# Patient Record
Sex: MALE | Race: WHITE | NOT HISPANIC OR LATINO | Employment: OTHER | URBAN - METROPOLITAN AREA
[De-identification: names, ages, dates, MRNs, and addresses within clinical notes are randomized per-mention and may not be internally consistent; named-entity substitution may affect disease eponyms.]

---

## 2024-02-21 LAB
NON-UH HIE AGAP: 9 MEQ/L (ref 6–16)
NON-UH HIE ALT: 21 INT._UNIT/L (ref 6–46)
NON-UH HIE AST: 19 INT._UNIT/L (ref 5–43)
NON-UH HIE BUN/CREAT RATIO: 18 NO UNITS (ref 10–20)
NON-UH HIE BUN: 22 MG/DL (ref 5–21)
NON-UH HIE CALCIUM LVL: 9.2 MG/DL (ref 8.9–11.1)
NON-UH HIE CHLORIDE: 108 MMOL/L (ref 101–111)
NON-UH HIE CHOL: 154 MG/DL (ref 120–200)
NON-UH HIE CO2: 29 MMOL/L (ref 21–31)
NON-UH HIE CREATININE: 1.2 MG/DL (ref 0.5–1.3)
NON-UH HIE EGFR: 64 ML/MIN/1.73 M2
NON-UH HIE GLUCOSE LVL: 107 MG/DL (ref 55–199)
NON-UH HIE HDL: 71 MG/DL
NON-UH HIE LDL DIRECT: 65 MG/DL
NON-UH HIE POTASSIUM LVL: 4.6 MMOL/L (ref 3.5–5.3)
NON-UH HIE SODIUM LVL: 141 MMOL/L (ref 135–145)
NON-UH HIE TRIG: 60 MG/DL
NON-UH HIE VLDL: 12 MG/DL (ref 7–40)

## 2024-03-25 PROBLEM — R00.1 SINUS BRADYCARDIA: Status: ACTIVE | Noted: 2024-03-25

## 2024-03-25 PROBLEM — E78.5 HYPERLIPIDEMIA: Status: ACTIVE | Noted: 2024-03-25

## 2024-03-25 PROBLEM — R94.31 ABNORMAL EKG: Status: ACTIVE | Noted: 2024-03-25

## 2024-03-25 PROBLEM — I10 ESSENTIAL HYPERTENSION: Status: ACTIVE | Noted: 2024-03-25

## 2024-03-25 PROBLEM — I49.3 PREMATURE VENTRICULAR CONTRACTIONS: Status: ACTIVE | Noted: 2024-03-25

## 2024-03-25 PROBLEM — R55 SYNCOPE: Status: ACTIVE | Noted: 2024-03-25

## 2024-03-25 RX ORDER — TAMSULOSIN HYDROCHLORIDE 0.4 MG/1
0.4 CAPSULE ORAL DAILY
COMMUNITY

## 2024-03-25 RX ORDER — CALCIUM CARBONATE 300MG(750)
1 TABLET,CHEWABLE ORAL DAILY
COMMUNITY

## 2024-03-25 RX ORDER — MULTIVITAMIN/IRON/FOLIC ACID 18MG-0.4MG
1 TABLET ORAL DAILY
COMMUNITY

## 2024-03-25 RX ORDER — ATORVASTATIN CALCIUM 20 MG/1
20 TABLET, FILM COATED ORAL NIGHTLY
COMMUNITY

## 2024-03-25 RX ORDER — CHOLECALCIFEROL (VITAMIN D3) 25 MCG
1000 TABLET ORAL DAILY
COMMUNITY

## 2024-03-27 ENCOUNTER — OFFICE VISIT (OUTPATIENT)
Dept: DERMATOLOGY | Facility: CLINIC | Age: 74
End: 2024-03-27
Payer: COMMERCIAL

## 2024-03-27 DIAGNOSIS — D22.9 NEVUS: ICD-10-CM

## 2024-03-27 DIAGNOSIS — Z12.83 SKIN CANCER SCREENING: Primary | ICD-10-CM

## 2024-03-27 DIAGNOSIS — L82.1 SEBORRHEIC KERATOSIS: ICD-10-CM

## 2024-03-27 DIAGNOSIS — L57.0 ACTINIC KERATOSIS: ICD-10-CM

## 2024-03-27 DIAGNOSIS — L81.4 LENTIGO: ICD-10-CM

## 2024-03-27 PROCEDURE — 99213 OFFICE O/P EST LOW 20 MIN: CPT | Performed by: NURSE PRACTITIONER

## 2024-03-27 PROCEDURE — 1159F MED LIST DOCD IN RCRD: CPT | Performed by: NURSE PRACTITIONER

## 2024-03-27 NOTE — PROGRESS NOTES
Subjective     Kai Acevedo is a 74 y.o. male who presents for the following: Skin Check.     Established patient in for yearly skin exam.     Review of Systems:  No other skin or systemic complaints other than what is documented elsewhere in the note.    The following portions of the chart were reviewed this encounter and updated as appropriate:       Skin Cancer History  [facility]- Right upper chest - 2012    Specialty Problems    None    Past Medical History:  Kai Acevedo  has no past medical history on file.    Past Surgical History:  Kai Acevedo  has a past surgical history that includes Other surgical history (07/27/2022); Other surgical history (07/27/2022); Other surgical history (07/27/2022); Other surgical history (07/27/2022); Other surgical history (07/27/2022); and Other surgical history (07/27/2022).    Family History:  Patient family history includes No Known Problems in his mother.    Social History:  Kai Acevedo  reports that he has never smoked. He does not have any smokeless tobacco history on file. He reports current alcohol use. He reports that he does not use drugs.    Allergies:  Patient has no known allergies.    Current Medications / CAM's:    Current Outpatient Medications:     atorvastatin (Lipitor) 20 mg tablet, Take 1 tablet (20 mg) by mouth once daily at bedtime., Disp: , Rfl:     cholecalciferol (Vitamin D3) 25 MCG (1000 UT) tablet, Take 1 tablet (1,000 Units) by mouth once daily., Disp: , Rfl:     magnesium oxide (Mag-Ox) 400 mg tablet, Take 1 tablet (400 mg) by mouth once daily., Disp: , Rfl:     multivitamin with minerals (Centrum) tablet, Take 1 tablet by mouth once daily., Disp: , Rfl:     tamsulosin (Flomax) 0.4 mg 24 hr capsule, Take 1 capsule (0.4 mg) by mouth once daily., Disp: , Rfl:     vit A,C and E-lutein-minerals (Ocuvite) 300 mcg-200 mg-27 mg-2 mg tablet, Take 1 tablet by mouth once daily., Disp: , Rfl:      Objective   Well appearing patient in no apparent distress;  mood and affect are within normal limits.    A full examination was performed including scalp, head, eyes, ears, nose, lips, neck, chest, axillae, abdomen, back, buttocks, bilateral upper extremities, bilateral lower extremities, hands, feet, fingers, toes, fingernails, and toenails. All findings within normal limits unless otherwise noted below.    Assessment/Plan   1. Skin cancer screening    The patient presented for a routine skin examination today. There are no specific concerns regarding skin health and no new or changing moles, lesions, or rashes.     Assessment: Based on the comprehensive skin examination, there were no concerning or abnormal findings. The patient's skin appeared to be in good health, without any notable dermatologic conditions or lesions.    Plan: Given the absence of any significant skin findings, no specific interventions or treatments are warranted at this time. The patient was educated on the importance of regular skin self-examinations and advised to promptly report any changes or concerns. Routine follow-up for a skin examination was recommended.    -These lesions have benign, reassuring patterns on dermoscopy.  -There were no concerning features found on exam today.  -Recommend continued self-observation, and to contact the office if any changes in nevi are  noticed.    Discussed/information given on safe sun practices and use of sunscreen, sun protective clothing or sun avoidance. Recommend to use OTC medication of sunscreen SPF 30 or higher on a daily basis prior to sun exposure to reduce the risk of skin cancer.    Contact Office if: Any lesions change in size, shape or color; itch, bum or bleed.         2. Nevus  Multiple benign appearing flesh colored to pigmented macules and papules     Plan: Counseling.  I counseled the patient regarding the following:  Instructions: Monthly self-skin checks to monitor for any changes in moles are recommended. Expectations: Benign Nevi are  "pigmented nests of cells within the skin.No treatment is necessary. Contact Office if: Any moles change in size, shape or color; itch, bum or bleed.    3. Lentigo  Scattered tan macules in sun-exposed areas.    Solar lentigo (a type of lentigo also known as a senile lentigo, age spot, or liver spot) is a benign pigmented macule appearing on fair-skinned individuals that is related to ultraviolet radiation (UVR) exposure, typically from the sun.     PLAN:  Limiting sun exposure through avoidance, protective clothing, and use of sunscreens can help prevent the appearance of solar lentigines.    If lesion changes or becomes symptomatic she should return to clinic    4. Seborrheic keratosis    Seborrheic keratoses (SKs) are extremely common benign neoplasms of the skin. There can be few or hundreds of these raised, \"stuck-on\"-appearing papules and plaques with well-defined borders. The cause is unknown, although there is a familial trait for the development of multiple SKs.      SKs tend to increase in incidence and number with increasing age.     Skin Care: Seborrheic Keratoses are benign. No treatment is necessary.    Patient was instructed to call the office if any lesions become irritated or inflamed        5. Actinic keratosis  Mid Frontal Scalp  Erythematous macules with gritty scale.    Destr of lesion - Mid Frontal Scalp  Complexity: simple    Destruction method: cryotherapy    Informed consent: discussed and consent obtained    Lesion destroyed using liquid nitrogen: Yes    Cryotherapy cycles:  1  Outcome: patient tolerated procedure well with no complications    Post-procedure details: wound care instructions given             "

## 2024-09-17 ENCOUNTER — APPOINTMENT (OUTPATIENT)
Dept: CARDIOLOGY | Facility: CLINIC | Age: 74
End: 2024-09-17
Payer: COMMERCIAL

## 2024-09-17 VITALS
DIASTOLIC BLOOD PRESSURE: 77 MMHG | HEART RATE: 51 BPM | WEIGHT: 232 LBS | HEIGHT: 69 IN | SYSTOLIC BLOOD PRESSURE: 131 MMHG | BODY MASS INDEX: 34.36 KG/M2

## 2024-09-17 DIAGNOSIS — I49.3 PREMATURE VENTRICULAR CONTRACTIONS: ICD-10-CM

## 2024-09-17 DIAGNOSIS — R00.1 SINUS BRADYCARDIA: ICD-10-CM

## 2024-09-17 DIAGNOSIS — I10 ESSENTIAL HYPERTENSION: ICD-10-CM

## 2024-09-17 DIAGNOSIS — R55 SYNCOPE, UNSPECIFIED SYNCOPE TYPE: Primary | ICD-10-CM

## 2024-09-17 DIAGNOSIS — E78.2 MIXED HYPERLIPIDEMIA: ICD-10-CM

## 2024-09-17 DIAGNOSIS — R94.31 ABNORMAL EKG: ICD-10-CM

## 2024-09-17 PROBLEM — Z72.0 CHEWS TOBACCO: Status: ACTIVE | Noted: 2024-09-17

## 2024-09-17 PROBLEM — R60.0 LOWER EXTREMITY EDEMA: Status: ACTIVE | Noted: 2024-09-17

## 2024-09-17 PROCEDURE — 93000 ELECTROCARDIOGRAM COMPLETE: CPT | Performed by: INTERNAL MEDICINE

## 2024-09-17 PROCEDURE — 1159F MED LIST DOCD IN RCRD: CPT | Performed by: INTERNAL MEDICINE

## 2024-09-17 PROCEDURE — 3008F BODY MASS INDEX DOCD: CPT | Performed by: INTERNAL MEDICINE

## 2024-09-17 PROCEDURE — 3075F SYST BP GE 130 - 139MM HG: CPT | Performed by: INTERNAL MEDICINE

## 2024-09-17 PROCEDURE — 99214 OFFICE O/P EST MOD 30 MIN: CPT | Performed by: INTERNAL MEDICINE

## 2024-09-17 PROCEDURE — 1160F RVW MEDS BY RX/DR IN RCRD: CPT | Performed by: INTERNAL MEDICINE

## 2024-09-17 PROCEDURE — 4004F PT TOBACCO SCREEN RCVD TLK: CPT | Performed by: INTERNAL MEDICINE

## 2024-09-17 PROCEDURE — 3078F DIAST BP <80 MM HG: CPT | Performed by: INTERNAL MEDICINE

## 2024-09-17 RX ORDER — ATORVASTATIN CALCIUM 20 MG/1
20 TABLET, FILM COATED ORAL NIGHTLY
Qty: 90 TABLET | Refills: 3 | Status: SHIPPED | OUTPATIENT
Start: 2024-09-17 | End: 2025-09-17

## 2024-09-17 NOTE — PROGRESS NOTES
"Subjective   Kai Acevedo is a 74 y.o. male       Chief Complaint    Annual Exam          HPI     Patient is here for follow-up continue management for previous evaluation for syncope attributed to BP medication.  Since last time I saw him he denies chest pain, palpitation, lightheadedness, dizziness or syncope.  He remains active.  He described minimal lower extremity edema which improved when he goes to bed.  He admits to high salt intake.    Assessment     1. Prior evaluation for syncope, attributed to adjustment of his blood pressure medication, his blood pressure remains controlled without any antihypertensive medication. No recurrence  2. Documentation of borderline abnormal ECG and a few premature ventricular contractions he underwent extensive work-up in the past including [employer] monitor, stress test and echocardiogram and carotid Doppler.  All were reassuring  3.  Mild sinus bradycardia asymptomatic  4. Obesity with minor weight gain  5.. Hyperlipidemia on treatment.  Blood work done through PCP result not available to me  6.  Intermittent edema appears mainly due to salt indiscretion  7.  Patient should tobacco        RECOMMENDATION:      1. I reviewed with the patient available lab but I do not have his lipid profile  2. Continue present medical regimen  3. The patient was advised to try to lose weight, exercise.  In addition I emphasized salt restriction  4. We will see him back in the office in 1 year.  With an EKG  5. Patient was advised about trying to change in cardiac status or symptoms  6.  Patient was advised against tobacco use  Review of Systems   Cardiovascular:  Positive for leg swelling.   All other systems reviewed and are negative.           Vitals:    09/17/24 0852 09/17/24 0912   BP: 142/80 131/77   BP Location: Left arm    Patient Position: Sitting    Pulse: 51    Weight: 105 kg (232 lb)    Height: 1.753 m (5' 9\")       EKG done in office today    Objective   Physical " Exam  Cardiovascular:      Rate and Rhythm: Bradycardia present.   Musculoskeletal:      Right lower le+ Edema present.      Left lower le+ Edema present.         Allergies  Patient has no known allergies.     Current Medications    Current Outpatient Medications:     atorvastatin (Lipitor) 20 mg tablet, Take 1 tablet (20 mg) by mouth once daily at bedtime., Disp: , Rfl:     cholecalciferol (Vitamin D3) 25 MCG (1000 UT) tablet, Take 1 tablet (1,000 Units) by mouth once daily., Disp: , Rfl:     magnesium oxide (Mag-Ox) 400 mg tablet, Take 1 tablet (400 mg) by mouth 2 times a day., Disp: , Rfl:     multivitamin with minerals (Centrum) tablet, Take 1 tablet by mouth once daily., Disp: , Rfl:     tamsulosin (Flomax) 0.4 mg 24 hr capsule, Take 1 capsule (0.4 mg) by mouth once daily., Disp: , Rfl:     vit A,C and E-lutein-minerals (Ocuvite) 300 mcg-200 mg-27 mg-2 mg tablet, Take 1 tablet by mouth once daily., Disp: , Rfl:                      Assessment/Plan   1. Syncope, unspecified syncope type        2. Sinus bradycardia        3. Premature ventricular contractions        4. Mixed hyperlipidemia        5. Essential hypertension        6. Abnormal EKG        7. BMI 34.0-34.9,adult                 Scribe Attestation  By signing my name below, Alicia FRANK LPN, Scribe   attest that this documentation has been prepared under the direction and in the presence of El Ochoa MD.     Provider Attestation - Scribe documentation    All medical record entries made by the Scribe were at my direction and personally dictated by me. I have reviewed the chart and agree that the record accurately reflects my personal performance of the history, physical exam, discussion and plan.

## 2024-09-17 NOTE — LETTER
September 17, 2024     Kristin Ding, APRN-CNP  [address] [facility] [address]    Patient: Kai Acevedo   YOB: 1950   Date of Visit: 9/17/2024       Dear Dr. Kristin Ding, APRN-IDRIS:    Thank you for referring Kai Acevedo to me for evaluation. Below are my notes for this consultation.  If you have questions, please do not hesitate to call me. I look forward to following your patient along with you.       Sincerely,     El Ochoa MD      CC: No Recipients  ______________________________________________________________________________________    Subjective   Kai Acevedo is a 74 y.o. male       Chief Complaint    Annual Exam          HPI     Patient is here for follow-up continue management for previous evaluation for syncope attributed to BP medication.  Since last time I saw him he denies chest pain, palpitation, lightheadedness, dizziness or syncope.  He remains active.  He described minimal lower extremity edema which improved when he goes to bed.  He admits to high salt intake.    Assessment     1. Prior evaluation for syncope, attributed to adjustment of his blood pressure medication, his blood pressure remains controlled without any antihypertensive medication. No recurrence  2. Documentation of borderline abnormal ECG and a few premature ventricular contractions he underwent extensive work-up in the past including [employer] of [employer] monitor, stress test and echocardiogram and carotid Doppler.  All were reassuring  3.  Mild sinus bradycardia asymptomatic  4. Obesity with minor weight gain  5.. Hyperlipidemia on treatment.  Blood work done through PCP result not available to me  6.  Intermittent edema appears mainly due to salt indiscretion        RECOMMENDATION:      1. I reviewed with the patient available lab but I do not have his lipid profile  2. Continue present medical regimen  3. The patient was advised to try to lose weight, exercise.  In addition I emphasized salt  "restriction  4. We will see him back in the office in 1 year.  With an EKG  5. Patient was advised about trying to change in cardiac status or symptoms   Review of Systems   Cardiovascular:  Positive for leg swelling.   All other systems reviewed and are negative.           Vitals:    24 0852 24 0912   BP: 142/80 131/77   BP Location: Left arm    Patient Position: Sitting    Pulse: 51    Weight: 105 kg (232 lb)    Height: 1.753 m (5' 9\")       EKG done in office today    Objective   Physical Exam  Cardiovascular:      Rate and Rhythm: Bradycardia present.   Musculoskeletal:      Right lower le+ Edema present.      Left lower le+ Edema present.         Allergies  Patient has no known allergies.     Current Medications    Current Outpatient Medications:   •  atorvastatin (Lipitor) 20 mg tablet, Take 1 tablet (20 mg) by mouth once daily at bedtime., Disp: , Rfl:   •  cholecalciferol (Vitamin D3) 25 MCG (1000 UT) tablet, Take 1 tablet (1,000 Units) by mouth once daily., Disp: , Rfl:   •  magnesium oxide (Mag-Ox) 400 mg tablet, Take 1 tablet (400 mg) by mouth 2 times a day., Disp: , Rfl:   •  multivitamin with minerals (Centrum) tablet, Take 1 tablet by mouth once daily., Disp: , Rfl:   •  tamsulosin (Flomax) 0.4 mg 24 hr capsule, Take 1 capsule (0.4 mg) by mouth once daily., Disp: , Rfl:   •  vit A,C and E-lutein-minerals (Ocuvite) 300 mcg-200 mg-27 mg-2 mg tablet, Take 1 tablet by mouth once daily., Disp: , Rfl:                      Assessment/Plan   1. Syncope, unspecified syncope type        2. Sinus bradycardia        3. Premature ventricular contractions        4. Mixed hyperlipidemia        5. Essential hypertension        6. Abnormal EKG        7. BMI 34.0-34.9,adult                 Scribe Attestation  By signing my name below, Alicia FRANK LPN   , [place]   attest that this documentation has been prepared under the direction and in the presence of El Ochoa MD.     Provider Attestation " - Scribe documentation    All medical record entries made by the Scribe were at my direction and personally dictated by me. I have reviewed the chart and agree that the record accurately reflects my personal performance of the history, physical exam, discussion and plan.

## 2024-09-17 NOTE — PATIENT INSTRUCTIONS
Please bring all medicines, vitamins, and herbal supplements with you when you come to the office.    Prescriptions will not be filled unless you are compliant with your follow up appointments or have a follow up appointment scheduled as per instruction of your physician. Refills should be requested at the time of your visit.     Fall Prevention Education Given     BMI was above normal measurement. Current weight: 105 kg (232 lb)  Weight change since last visit (-) denotes wt loss 6 lbs   Weight loss needed to achieve BMI 25: 63.1 Lbs  Weight loss needed to achieve BMI 30: 29.3 Lbs  Provided instructions on dietary changes  Provided instructions on exercise.    One year with ekg

## 2025-04-02 ENCOUNTER — APPOINTMENT (OUTPATIENT)
Dept: DERMATOLOGY | Facility: CLINIC | Age: 75
End: 2025-04-02
Payer: COMMERCIAL

## 2025-04-02 DIAGNOSIS — R21 RASH AND OTHER NONSPECIFIC SKIN ERUPTION: ICD-10-CM

## 2025-04-02 DIAGNOSIS — Z86.018 HISTORY OF DYSPLASTIC NEVUS: ICD-10-CM

## 2025-04-02 DIAGNOSIS — L57.0 ACTINIC KERATOSIS: ICD-10-CM

## 2025-04-02 DIAGNOSIS — L82.1 SEBORRHEIC KERATOSIS: ICD-10-CM

## 2025-04-02 DIAGNOSIS — Z12.83 SCREENING EXAM FOR SKIN CANCER: ICD-10-CM

## 2025-04-02 DIAGNOSIS — L81.4 LENTIGO: ICD-10-CM

## 2025-04-02 DIAGNOSIS — D22.9 NEVUS: Primary | ICD-10-CM

## 2025-04-02 PROCEDURE — 99213 OFFICE O/P EST LOW 20 MIN: CPT | Performed by: NURSE PRACTITIONER

## 2025-04-02 PROCEDURE — 1159F MED LIST DOCD IN RCRD: CPT | Performed by: NURSE PRACTITIONER

## 2025-04-02 NOTE — PROGRESS NOTES
Subjective     Kai Acevedo is a 75 y.o. male who presents for the following: Skin Check.   Established patient in for yearly full body skin exam.     Review of Systems:  No other skin or systemic complaints other than what is documented elsewhere in the note.    The following portions of the chart were reviewed this encounter and updated as appropriate:       Skin Cancer History  [facility]- Right upper chest - 2012     Specialty Problems    None    Past Medical History:  Kai Acevedo  has no past medical history on file.    Past Surgical History:  Kai Acevedo  has a past surgical history that includes Other surgical history (07/27/2022); Other surgical history (07/27/2022); Other surgical history (07/27/2022); Other surgical history (07/27/2022); Other surgical history (07/27/2022); and Other surgical history (07/27/2022).    Family History:  Patient family history includes No Known Problems in his mother.    Social History:  Kai Acevedo  reports that he has never smoked. His smokeless tobacco use includes chew. He reports current alcohol use of about 21.0 standard drinks of alcohol per week. He reports that he does not use drugs.    Allergies:  Patient has no known allergies.    Current Medications / CAM's:    Current Outpatient Medications:     atorvastatin (Lipitor) 20 mg tablet, Take 1 tablet (20 mg) by mouth once daily at bedtime., Disp: 90 tablet, Rfl: 3    cholecalciferol (Vitamin D3) 25 MCG (1000 UT) tablet, Take 1 tablet (1,000 Units) by mouth once daily., Disp: , Rfl:     magnesium oxide (Mag-Ox) 400 mg tablet, Take 1 tablet (400 mg) by mouth 2 times a day., Disp: , Rfl:     multivitamin with minerals (Centrum) tablet, Take 1 tablet by mouth once daily., Disp: , Rfl:     tamsulosin (Flomax) 0.4 mg 24 hr capsule, Take 1 capsule (0.4 mg) by mouth once daily., Disp: , Rfl:     vit A,C and E-lutein-minerals (Ocuvite) 300 mcg-200 mg-27 mg-2 mg tablet, Take 1 tablet by mouth once daily., Disp: , Rfl:     "  Objective   Well appearing patient in no apparent distress; mood and affect are within normal limits.      Assessment/Plan   1. Nevus  Uniform pigmented macule(s)/papule(s) with reassuring findings on dermoscopy    -Discussed nature of condition  -Reassurance, benign-appearing features on examination today  -Recommend continued observation    2. Lentigo  Tan macules    -Benign appearing on exam  -Reassurance, recommend observation    3. Seborrheic keratosis  Stuck on, waxy macule(s)/papule(s)/plaque(s) with comedo-like openings and milia like cysts    -Discussed nature of condition  -Reassurance, recommend continued observation    4. Screening exam for skin cancer    Follow Up In Dermatology    5. Actinic keratosis (3)  Mid Frontal Scalp (2), Right Parotid Area  Erythematous scaly macules with one hyperkeratotic lesion to frontal scalp.  Patient states these \"come and go\".  He is aware to notify me if this lesion fails to resolve.     -Discussed nature of diagnosis and treatment options.   -Patient wishes to proceed with Cryotherapy today  -Possible side effects of liquid nitrogen treatment reviewed including formation of blisters, crusting, tenderness, scar, and discoloration which may be permanent.  -Patient advised to return the office for re-evaluation if the treated lesion(s) do not resolve within 4-6 weeks. Patient verbalizes understanding.    Destr of lesion - Mid Frontal Scalp (2), Right Parotid Area  Complexity: simple    Destruction method: cryotherapy    Informed consent: discussed and consent obtained    Lesion destroyed using liquid nitrogen: Yes    Region frozen until ice ball extended beyond lesion: Yes    Cryotherapy cycles:  1  Outcome: patient tolerated procedure well with no complications    Post-procedure details: wound care instructions given      6. History of dysplastic nevus  Chest - Medial (Center)  History of [facility] 2012     7. Rash and other nonspecific skin eruption  Left Thigh - " Anterior  Previous rash treated by PCP with betamethasone and clotrimazole with resolution.     - No additional treatment today as rash has resolved

## 2025-09-17 ENCOUNTER — APPOINTMENT (OUTPATIENT)
Dept: CARDIOLOGY | Facility: CLINIC | Age: 75
End: 2025-09-17
Payer: COMMERCIAL

## 2026-04-08 ENCOUNTER — APPOINTMENT (OUTPATIENT)
Dept: DERMATOLOGY | Facility: CLINIC | Age: 76
End: 2026-04-08
Payer: COMMERCIAL